# Patient Record
Sex: FEMALE | Race: WHITE | NOT HISPANIC OR LATINO | Employment: STUDENT | URBAN - METROPOLITAN AREA
[De-identification: names, ages, dates, MRNs, and addresses within clinical notes are randomized per-mention and may not be internally consistent; named-entity substitution may affect disease eponyms.]

---
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
## 2023-05-22 DIAGNOSIS — I35.1 NONRHEUMATIC AORTIC VALVE INSUFFICIENCY: ICD-10-CM

## 2023-05-22 DIAGNOSIS — I47.10 SVT (SUPRAVENTRICULAR TACHYCARDIA): Primary | ICD-10-CM

## 2023-06-12 ENCOUNTER — OFFICE VISIT (OUTPATIENT)
Dept: PEDIATRIC CARDIOLOGY | Facility: CLINIC | Age: 18
End: 2023-06-12
Payer: COMMERCIAL

## 2023-06-12 VITALS
RESPIRATION RATE: 18 BRPM | DIASTOLIC BLOOD PRESSURE: 62 MMHG | HEART RATE: 68 BPM | OXYGEN SATURATION: 99 % | WEIGHT: 126.31 LBS | SYSTOLIC BLOOD PRESSURE: 118 MMHG | BODY MASS INDEX: 20.3 KG/M2 | HEIGHT: 66 IN

## 2023-06-12 DIAGNOSIS — I47.10 SVT (SUPRAVENTRICULAR TACHYCARDIA): ICD-10-CM

## 2023-06-12 DIAGNOSIS — I35.1 NONRHEUMATIC AORTIC VALVE INSUFFICIENCY: ICD-10-CM

## 2023-06-12 PROCEDURE — 93000 ELECTROCARDIOGRAM COMPLETE: CPT | Mod: S$GLB,,, | Performed by: PEDIATRICS

## 2023-06-12 PROCEDURE — 3008F PR BODY MASS INDEX (BMI) DOCUMENTED: ICD-10-PCS | Mod: CPTII,S$GLB,, | Performed by: NURSE PRACTITIONER

## 2023-06-12 PROCEDURE — 3074F PR MOST RECENT SYSTOLIC BLOOD PRESSURE < 130 MM HG: ICD-10-PCS | Mod: CPTII,S$GLB,, | Performed by: NURSE PRACTITIONER

## 2023-06-12 PROCEDURE — 1160F PR REVIEW ALL MEDS BY PRESCRIBER/CLIN PHARMACIST DOCUMENTED: ICD-10-PCS | Mod: CPTII,S$GLB,, | Performed by: NURSE PRACTITIONER

## 2023-06-12 PROCEDURE — 99214 PR OFFICE/OUTPT VISIT, EST, LEVL IV, 30-39 MIN: ICD-10-PCS | Mod: 25,S$GLB,, | Performed by: NURSE PRACTITIONER

## 2023-06-12 PROCEDURE — 1159F PR MEDICATION LIST DOCUMENTED IN MEDICAL RECORD: ICD-10-PCS | Mod: CPTII,S$GLB,, | Performed by: NURSE PRACTITIONER

## 2023-06-12 PROCEDURE — 3078F PR MOST RECENT DIASTOLIC BLOOD PRESSURE < 80 MM HG: ICD-10-PCS | Mod: CPTII,S$GLB,, | Performed by: NURSE PRACTITIONER

## 2023-06-12 PROCEDURE — 3074F SYST BP LT 130 MM HG: CPT | Mod: CPTII,S$GLB,, | Performed by: NURSE PRACTITIONER

## 2023-06-12 PROCEDURE — 3078F DIAST BP <80 MM HG: CPT | Mod: CPTII,S$GLB,, | Performed by: NURSE PRACTITIONER

## 2023-06-12 PROCEDURE — 1160F RVW MEDS BY RX/DR IN RCRD: CPT | Mod: CPTII,S$GLB,, | Performed by: NURSE PRACTITIONER

## 2023-06-12 PROCEDURE — 99214 OFFICE O/P EST MOD 30 MIN: CPT | Mod: 25,S$GLB,, | Performed by: NURSE PRACTITIONER

## 2023-06-12 PROCEDURE — 1159F MED LIST DOCD IN RCRD: CPT | Mod: CPTII,S$GLB,, | Performed by: NURSE PRACTITIONER

## 2023-06-12 PROCEDURE — 3008F BODY MASS INDEX DOCD: CPT | Mod: CPTII,S$GLB,, | Performed by: NURSE PRACTITIONER

## 2023-06-12 PROCEDURE — 93000 EKG 12-LEAD: ICD-10-PCS | Mod: S$GLB,,, | Performed by: PEDIATRICS

## 2023-06-12 NOTE — PROGRESS NOTES
[facility]  Lesia Monreal  2005    Lesia Monreal is a 18 y.o. female presenting for follow-up of SVT, AI, and dysautonomia.  Lesia is here today with her significant other.    [facility]  Lesia Monreal was initially sent for cardiac evaluation in Feb of 2021 for chest pressure, dyspnea, and palpitations following COVID illness (dx 11/30/20). Work-up by PCP that day included CBC (WNL), D-dimer (<0.19, L), CMP (eGFR 128H, otherwise normal). She was referred due to chest pain, fatigue, dyspnea, and palpations and seen on 2/3/21.  At her visit here she reported fatigue, SOB, chest pain, palpations, dizziness, and anxiety. No murmur noted on exam. Work up was ordered including CRP, cardiac enzymes, holter and echo. Labs were normal. Echo showed trace AI.  Holter 2/3/21 showed sinus rhythm, rare PAC/PVC, and sinus rhythm with rare PVCs during diary symptoms. During awake hours, her HR was increased which was felt to be due to dysautonomia and protocol was recommended. Repeat holter was ordered 2-3 weeks after increasing fluid and sodium intake to see if helped with the average HR.  Repeat holter 4/12/21 showed one slow 13 beat run of SVT (likely atrial tachycardia) with max rate of 162 bpm. She was sitting in class during that time.   She reports she did not always press the button with symptoms if she was busy in class.  She was started on 12.5 mg of Tenormin BID due to her history of  one slow 13 beat run of SVT (likely atrial tachycardia) with max rate of 162 bpm and since she was symptomatic with heart racing several times a week.  Repeat holter unremarkable.       She was last seen in Feb of 2022 and at that time was doing well.  She had history of recent COVID and felt sluggish afterward.  Her atenolol was decreased to once a day with improvement.  Her exam that day revealed normal heart sounds and no murmur.  Her EKG was normal.  Holter was repeated on the lower dose of atenolol.  Echo was ordered and  she was asked to follow up in 1 year.    Lesia has been doing well since last visit. Lesia has a lot of energy and does not get short of breath with activity. Minimal positional dizziness. Denies any recent illness, surgeries, or hospitalizations.    There are no reports of chest pain, chest pain with exertion, cyanosis, exercise intolerance, dyspnea, fatigue, palpitations, syncope, and tachypnea. No other cardiovascular or medical concerns are reported.     Current Medications:   Current Outpatient Medications on File Prior to Visit   Medication Sig Dispense Refill    atenoloL (TENORMIN) 25 MG tablet Take 0.5 tablets (12.5 mg total) by mouth once daily. 45 tablet 2     No current facility-administered medications on file prior to visit.     Allergies: Review of patient's allergies indicates:  No Known Allergies      Family History   Problem Relation Age of Onset    Hypertension Mother     Hypertension Father     No Known Problems Sister     Colon cancer Maternal Grandmother     Dementia Maternal Grandmother     Hypertension Maternal Grandmother     Prostate cancer Maternal Grandfather     Hypertension Maternal Grandfather     Diabetes Maternal Grandfather     Coronary artery disease Paternal Grandfather         stents and bypass    Heart attacks under age 50 Paternal Grandfather     No Known Problems Sister     Arrhythmia Neg Hx     Cardiomyopathy Neg Hx     Congenital heart disease Neg Hx     Early death Neg Hx     Long QT syndrome Neg Hx      Past Medical History:   Diagnosis Date    Aortic insufficiency     COVID-19 11/2020    Dizziness     Dysautonomia     SVT (supraventricular tachycardia)      Social History     Socioeconomic History    Marital status: Single   Social History Narrative    Exercise:light workouts at home.     Fluid intake: tap and bottled water all day (6+ bottles), occasional sprite    Sleep: 6 hours each night     Past Surgical History:   Procedure Laterality Date    ADENOIDECTOMY       "MYRINGOTOMY W/ TUBES      x 5 sets    NASAL SINUS SURGERY      Sinus Irrigation        Review of Systems    GENERAL: No fever, chills, fatigability, malaise  or weight loss.  CHEST: Denies dyspnea on exertion, cyanosis, wheezing, cough, sputum production   CARDIOVASCULAR: Denies chest pain, palpitations, diaphoresis,  or reduced exercise tolerance.  ABDOMEN: Appetite normal. Denies diarrhea, abdominal pain, nausea or vomiting.  PERIPHERAL VASCULAR: No edema or cyanosis.  NEUROLOGIC: no dizziness, no syncope , no headache   MUSCULOSKELETAL: Denies muscle weakness, joint pain  PSYCHOLOGICAL/BEHAVIORAL: Denies anxiety, severe stress, confusion  SKIN: no rashes, lesions  HEMATOLOGIC: Denies any abnormal bruising or bleeding  ALLERGY/IMMUNOLOGIC: Denies any environmental allergies.     Objective:   /62 (BP Location: Right arm, Patient Position: Sitting, BP Method: Medium (Manual))   Pulse 68   Resp 18   Ht 5' 5.75" (1.67 m)   Wt 57.3 kg (126 lb 5.2 oz)   SpO2 99%   BMI 20.55 kg/m²     Physical Exam  GENERAL: Awake, well-developed well-nourished, no apparent distress  HEENT: mucous membranes moist and pink, normocephalic, no cranial or carotid bruits, sclera anicteric  CHEST: Good air movement, clear to auscultation bilaterally  CARDIOVASCULAR: Quiet precordium, regular rhythm, single S1, split S2, muffled P2, No S3 or S4, no rub. No clicks or rumbles. No cardiomegaly by palpation. No murmur  ABDOMEN: Soft, nontender nondistended, no hepatosplenomegaly, no aortic bruits  EXTREMITIES: Warm well perfused, 2+ brachial/femoral pulses, capillary refill <3 seconds, no clubbing, cyanosis, or edema  NEURO: Alert and oriented, cooperative with exam, face symmetric, moves all extremities well.    Tests:   Today's EKG interpretation by Dr. Haley reveals:   Sinus Rhythm  PAC  Otherwise normal  (Final report in electronic medical record)    Echocardiogram:   Pertinent findings from the Echo dated 6/17/22 are:   There are 4 " chambers with normally aligned great vessels.  Chamber sizes are qualitatively normal.  There is good LV function.  There are no shunts noted.  Physiological TR, PI.  The right coronary artery and left coronary are patent by 2D.  Trace AI  No AS, nor coarctation of Ao  LA Volume 19 ml/m2   LV lateral tissue doppler data WNL   TAPSE 2.3 cm   Clinical Correlation Suggested  Selective IE  Followup suggested  (Full report in electronic medical record)    Holter/Event results from 2/7/22 are:  Sinus rhythm  Rare atrial ectopy including one slow triplet  Rare PVCs  No diary symptoms      Assessment:  1. SVT (supraventricular tachycardia)    2. Nonrheumatic aortic valve insufficiency        Discussion/Plan:   Lesai Monreal is a 18 y.o. female with a history of slow SVT on atenolol 25 mg daily and trace aortic insufficiency.  She is doing well with minimal complaints of dizziness.  She has improved her intake.  No complaints of palpitations.  One PAC noted on an otherwise normal EKG.  We will plan for echo in the near future to reassess the valve and follow-up in 1 year.     We discussed possible symptoms of dysrhythmias including fluttering feeling in the chest, shortness of breath, chest pain or pressure, neck fullness, lightheadedness or dizziness, fainting or almost fainting, palpitations (the sense that your heart is fluttering or beating fast or hard or irregularly), tiredness, fatigue, or weakness. The family should contact the primary provider if these symptoms occur and if needed, we will see the patient.    I have reviewed our general guidelines related to cardiac issues with the family.  I instructed them in the event of an emergency to call 911 or go to the nearest emergency room.  They know to contact the PCP if problems arise or if they are in doubt. The patient should see a dentist every 6 months for routine dental care.    Follow up with the primary care provider for the following issues: Nothing  identified.    Activity:No activity restrictions are indicated at this time. Activities may include endurance training, interscholastic athletic, competition and contact sports.    Selective endocarditis prophylaxis is recommended in this circumstance.     I spent over 30 minutes with the patient. Over 50% of the time was spent counseling the patient and family member.    Patient or family member was asked to call the office within 3 days of any testing for results.     Dr. Haley reviewed history and physical exam. He then performed the physical exam. He discussed the findings with the patient's caregiver(s), and answered all questions. I have reviewed our general guidelines related to cardiac issues with the family. I instructed them in the event of an emergency to call 911 or go to the nearest emergency room. They know to contact the PCP if problems arise or if they are in doubt.    Medications:   Current Outpatient Medications   Medication Sig    atenoloL (TENORMIN) 25 MG tablet Take 0.5 tablets (12.5 mg total) by mouth once daily.     No current facility-administered medications for this visit.      Orders:   Orders Placed This Encounter   Procedures    Pediatric Echo     Follow-Up:     Return to clinic in one year with EKG or sooner if there are any concerns. Echo.      Sincerely,  Tony Haley MD    Note Contributing Authors:  MD Lc Skinner, FRANKP-C  This documentation was created using [employer] voice recognition software. Content is subject to voice recognition errors.    06/12/2023    Attestation: Tony Haley MD    I have reviewed the records and agree with the above.

## 2023-06-12 NOTE — PATIENT INSTRUCTIONS
Tony Haley MD  Pediatric Cardiology  [address] [place] [id number]  [phone]    General Guidelines    Name: Lesia Monreal                   : 2005    Diagnosis:   1. SVT (supraventricular tachycardia)    2. Nonrheumatic aortic valve insufficiency        PCP: MARY Navarrete  PCP Phone Number: [phone]    If you have an emergency or you think you have an emergency, go to the nearest emergency room!     Breathing too fast, doesnt look right, consistently not eating well, your child needs to be checked. These are general indications that your child is not feeling well. This may be caused by anything, a stomach virus, an ear ache or heart disease, so please call MARY Navarrete. If MARY Navarrete thinks you need to be checked for your heart, they will let us know.     If your child experiences a rapid or very slow heart rate and has the following symptoms, call MARY Navarrete or go to the nearest emergency room.   unexplained chest pain   does not look right   feels like they are going to pass out   actually passes out for unexplained reasons   weakness or fatigue   shortness of breath  or breathing fast   consistent poor feeding     If your child experiences a rapid or very slow heart rate that lasts longer than 30 minutes call MARY Navarrete or go to the nearest emergency room.     If your child feels like they are going to pass out - have them sit down or lay down immediately. Raise the feet above the head (prop the feet on a chair or the wall) until the feeling passes. Slowly allow the child to sit, then stand. If the feeling returns, lay back down and start over.     It is very important that you notify MARY Navarrete first. MARY Navarrete or the ER Physician can reach Dr. Tony Haley at the office or through [facility] PICU at [phone] as needed.    Call our office ([phone]) one week after ALL tests for results.      PREVENTION OF BACTERIAL ENDOCARDITIS (selective IE)    A COPY OF THIS SHEET MUST BE GIVEN TO ALL OF YOUR DOCTORS OR HEALTH CARE PROVIDERS    You have received this information because you are at an increased risk for developing adverse outcomes from infective endocarditis (IE), also known as subacute bacterial endocarditis (SBE).    Patient Name:  Lesia Monreal    : 2005   Diagnosis:   1. SVT (supraventricular tachycardia)    2. Nonrheumatic aortic valve insufficiency        As of 2023, Tony Haley MD, Pediatric Cardiologist recommends that Lesia receive SELECTIVE USE of antibiotic prophylaxis from bacterial endocarditis.    Antibiotic prophylaxis with dental or surgical procedures is recommended in selected instances if your dentist, surgeon or physician believes there is a greater risk of infection.  For example:  1) Any significantly infected operative field (Example: dental abscess or ruptured appendix) which may increase the bacterial load to the blood stream during the procedure; 2) Benefits of antibiotic coverage should be weighed against risk of allergic reactions and anaphylaxis; therefore, their use should be carefully selected based on individual cases.     Antibiotic prophylaxis is NOT recommended for the following dental procedures or events: routine anesthetic injections through non-infected tissue; taking dental radiographs; placement of removable prosthodontic or orthodontic appliances; adjustment of orthodontic appliances; placement of orthodontic brackets; and shedding of deciduous teeth or bleeding from trauma to the lips or oral mucosa.   If recommended by the Health Care Provider - Antibiotic Prophylactic Regimens   Regimen - Single Dose 30-60 minutes before Procedure  Situation Agent Adults Children   Oral Amoxicillin 2g 50/mg/kg   Unable to take oral meds Ampicillin   OR  Cefazolin or ceftriaxone 2 g IM or IV1    1 g IM or IV 50 mg/kg IM or IV    50 mg/kg IM or IV   Allergic  to Penicillins or ampicillin-Oral regimen Cephalexin 2  OR  Clindamycin  OR  Azithromycin or clarithromycin 2 g    600 mg    500 mg 50 mg/kg    20 mg/kg    15 mg/kg   Allergic to penicillin or ampicillin and unable to take oral medications Cefazolin or ceftriaxone 3  OR  Clindamycin 1 g IM or IV    600 mg IM or IV 50 mg/kg IM or IV    20 mg/kg IM or IV   1IM - intramuscular; IV - intravenous  2Or other first or second generation oral cephalosporin in equivalent adult or pediatric dosage.  3Cephalosporins should not be used in an individual with a history of anaphylaxis, angioedema or urticaria with penicillin or ampicillin.   Adapted from Prevention of Infective Endocarditis: Guidelines From the American Heart Association, by the Committee on Rheumatic Fever, Endocarditis, and Kawasaki Disease. Circulation, e-published April 19, 2007. Go to www.americanheart.org/presenter for more information.    The practice of giving patients antibiotics prior to a dental procedure is no longer recommended EXCEPT for patients with the highest risk of adverse outcomes resulting from bacterial endocarditis. We cannot exclude the possibility that an exceedingly small number of cases, if any, of bacterial endocarditis may be prevented by antibiotic prophylaxis prior to a dental procedure. The importance of good oral and dental health and regular visits to the dentist is important for patients at risk for bacterial endocarditis.  Gastrointestinal (GI)/Genitourinary () Procedures: Antibiotic prophylaxis solely to prevent bacterial endocarditis is no longer recommended for patients who undergo a GI or  tract procedures, including patients with the highest risk of adverse outcomes due to bacterial endocarditis.    Good dental health and hygiene is very effective in preventing bacterial endocarditis.   Always practice good dental health!

## 2023-12-20 DIAGNOSIS — I47.10 SVT (SUPRAVENTRICULAR TACHYCARDIA): Primary | ICD-10-CM

## 2023-12-20 DIAGNOSIS — I35.1 NONRHEUMATIC AORTIC VALVE INSUFFICIENCY: ICD-10-CM

## 2024-01-02 DIAGNOSIS — I35.1 NONRHEUMATIC AORTIC VALVE INSUFFICIENCY: ICD-10-CM

## 2024-01-02 DIAGNOSIS — G90.1 DYSAUTONOMIA: Primary | ICD-10-CM

## 2024-01-05 ENCOUNTER — OFFICE VISIT (OUTPATIENT)
Dept: PEDIATRIC CARDIOLOGY | Facility: CLINIC | Age: 19
End: 2024-01-05
Payer: COMMERCIAL

## 2024-01-05 ENCOUNTER — CLINICAL SUPPORT (OUTPATIENT)
Dept: PEDIATRIC CARDIOLOGY | Facility: CLINIC | Age: 19
End: 2024-01-05
Payer: COMMERCIAL

## 2024-01-05 VITALS
HEART RATE: 67 BPM | SYSTOLIC BLOOD PRESSURE: 116 MMHG | DIASTOLIC BLOOD PRESSURE: 64 MMHG | HEIGHT: 66 IN | WEIGHT: 128.06 LBS | OXYGEN SATURATION: 99 % | BODY MASS INDEX: 20.58 KG/M2 | RESPIRATION RATE: 18 BRPM

## 2024-01-05 DIAGNOSIS — I47.10 SVT (SUPRAVENTRICULAR TACHYCARDIA): ICD-10-CM

## 2024-01-05 DIAGNOSIS — I35.1 NONRHEUMATIC AORTIC VALVE INSUFFICIENCY: ICD-10-CM

## 2024-01-05 DIAGNOSIS — R00.2 PALPITATIONS: Primary | ICD-10-CM

## 2024-01-05 DIAGNOSIS — G90.1 DYSAUTONOMIA: ICD-10-CM

## 2024-01-05 PROBLEM — Z86.16 HISTORY OF 2019 NOVEL CORONAVIRUS DISEASE (COVID-19): Status: RESOLVED | Noted: 2021-02-03 | Resolved: 2024-01-05

## 2024-01-05 PROCEDURE — 3008F BODY MASS INDEX DOCD: CPT | Mod: CPTII,S$GLB,, | Performed by: PHYSICIAN ASSISTANT

## 2024-01-05 PROCEDURE — 3078F DIAST BP <80 MM HG: CPT | Mod: CPTII,S$GLB,, | Performed by: PHYSICIAN ASSISTANT

## 2024-01-05 PROCEDURE — 3074F SYST BP LT 130 MM HG: CPT | Mod: CPTII,S$GLB,, | Performed by: PHYSICIAN ASSISTANT

## 2024-01-05 PROCEDURE — 99214 OFFICE O/P EST MOD 30 MIN: CPT | Mod: S$GLB,,, | Performed by: PHYSICIAN ASSISTANT

## 2024-01-05 PROCEDURE — 93000 ELECTROCARDIOGRAM COMPLETE: CPT | Mod: S$GLB,,, | Performed by: PEDIATRICS

## 2024-01-05 PROCEDURE — 1159F MED LIST DOCD IN RCRD: CPT | Mod: CPTII,S$GLB,, | Performed by: PHYSICIAN ASSISTANT

## 2024-01-05 PROCEDURE — 1160F RVW MEDS BY RX/DR IN RCRD: CPT | Mod: CPTII,S$GLB,, | Performed by: PHYSICIAN ASSISTANT

## 2024-01-05 NOTE — PATIENT INSTRUCTIONS
Tony Haley MD  Pediatric Cardiology  [address] [place] [id number]  [phone]    General Guidelines    Name: Lesia Monreal                   : 2005    Diagnosis:   1. Palpitations    2. SVT (supraventricular tachycardia)    3. Nonrheumatic aortic valve insufficiency        PCP: Naomi Head FNP-C  PCP Phone Number: [phone]    If you have an emergency or you think you have an emergency, go to the nearest emergency room!     Breathing too fast, doesnt look right, consistently not eating well, your child needs to be checked. These are general indications that your child is not feeling well. This may be caused by anything, a stomach virus, an ear ache or heart disease, so please call Naomi Head FNP-C. If Naomi Head FNP-C thinks you need to be checked for your heart, they will let us know.     If your child experiences a rapid or very slow heart rate and has the following symptoms, call Naomi Head FNP-C or go to the nearest emergency room.   unexplained chest pain   does not look right   feels like they are going to pass out   actually passes out for unexplained reasons   weakness or fatigue   shortness of breath  or breathing fast   consistent poor feeding     If your child experiences a rapid or very slow heart rate that lasts longer than 30 minutes call Naomi Head FNP-C or go to the nearest emergency room.     If your child feels like they are going to pass out - have them sit down or lay down immediately. Raise the feet above the head (prop the feet on a chair or the wall) until the feeling passes. Slowly allow the child to sit, then stand. If the feeling returns, lay back down and start over.     It is very important that you notify Naomi Head FNP-C first. Naomi Head FNP-C or the ER Physician can reach Dr. Tony Haley at the office or through [facility] at [phone] as needed.    Call our office  ([phone]) one week after ALL tests for results.

## 2024-01-05 NOTE — PROGRESS NOTES
[facility]  Lesia Monreal  2005    Lesia Monreal is a 18 y.o. female presenting for follow-up of   1. SVT (supraventricular tachycardia)    2. Nonrheumatic aortic valve insufficiency     Lesia is here today with her boyfriend.    HPI  Lesia was seen by PCP on 1/20/21 for chest pressure, dyspnea, and palpitations following COVID illness (dx 11/30/20). Work-up by PCP that day included CBC (WNL), D-dimer (<0.19, L), CMP (eGFR 128H, otherwise normal). She was referred due to chest pain, fatigue, dyspnea, and palpations and seen on 2/3/21.  At her visit here she reported fatigue, SOB, chest pain, palpations, dizziness, and anxiety. No murmur noted on exam. Work up was ordered including CRP, cardiac enzymes, holter and echo. Labs were normal. Echo showed trace AI.  Holter 2/3/21 showed sinus rhythm, rare PAC/PVC, and sinus rhythm with rare PVCs during diary symptoms. During awake hours, her HR was increased which was felt to be due to dysautonomia and protocol was recommended. Repeat holter was ordered 2-3 weeks after increasing fluid and sodium intake to see if helped with the average HR.  Repeat holter 4/12/21 showed one slow 13 beat run of SVT (likely atrial tachycardia) with max rate of 162 bpm. She was sitting in class during that time.   She reports she did not always press the button with symptoms if she was busy in class.  She was started on 12.5 mg of Tenormin BID due to her history of  one slow 13 beat run of SVT (likely atrial tachycardia) with max rate of 162 bpm and since she was symptomatic with heart racing several times a week.  Repeat holter was done 5/24/21 that showed Sinus rhythm with heart rates varying between 51 and 140 bpm with an average of 80 bpm, rare PAC/PVC and Sinus rhythm during diary symptom of dizziness.  In December 2021, Her PCP told her to decrease her Tenormin dose to once a day.     She was last seen 6/12/23. No murmur noted. Atenolol 25 mg daily was continued.  Echo was ordered which will be done today. She was given a 1 year follow up She returns early today due to palpations.      Lesia has been doing well since last visit. She reports 1 month ago her HR was in the 90's all day. She got in the shower and her was 180 bpm and was dizzy. She laid down and her HR dropped to 110 bpm. She reports episodes of fluttering followed by racing. This occurs mainly while in taking long showers.This occurs 1-2 times a week. The episodes will last about 1 minute but resolve if she sits.      She is drinking 16-32 oz a day and 2-3 cokes a day. She does not breakfast. She does not eat lunch. 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  She is tolerating Tenormin 12.5 mg daily.     Lesia has a lot of energy and does not get short of breath with activity.  Denies any recent illness, surgeries, or hospitalizations.    There are no reports of chest pain, chest pain with exertion, cyanosis, exercise intolerance, dyspnea, fatigue, syncope, and tachypnea. No other cardiovascular or medical concerns are reported.      Medications:   Medication List with Changes/Refills   Current Medications    ATENOLOL (TENORMIN) 25 MG TABLET    Take 0.5 tablets (12.5 mg total) by mouth once daily.      Allergies: Review of patient's allergies indicates:  No Known Allergies  Family History   Problem Relation Age of Onset    Hypertension Mother     Hypertension Father     No Known Problems Sister     Colon cancer Maternal Grandmother     Dementia Maternal Grandmother     Hypertension Maternal Grandmother     Prostate cancer Maternal Grandfather     Hypertension Maternal Grandfather     Diabetes Maternal Grandfather     Coronary artery disease Paternal Grandfather         stents and bypass    Heart attacks under age 50 Paternal Grandfather     No Known Problems Sister     Arrhythmia Neg Hx     Cardiomyopathy Neg Hx     Congenital heart disease Neg Hx     Early death Neg Hx      Long QT syndrome Neg Hx      Past Medical History:   Diagnosis Date    Aortic insufficiency     COVID-19 11/2020    SVT (supraventricular tachycardia)      Social History     Social History Narrative    Exercise:light workouts at home.     Fluid intake: tap and bottled water all day (6+ bottles), occasional sprite    Sleep: 6 hours each night    Birth control: was taking it for a couple of months and started making her have SVT episodes so she stopped taking them, wants to talk more about that      Past Surgical History:   Procedure Laterality Date    ADENOIDECTOMY      MYRINGOTOMY W/ TUBES      x 5 sets    NASAL SINUS SURGERY      Sinus Irrigation      No birth history on file.  Immunization History   Administered Date(s) Administered    DTaP 2005, 2005, 2003, 08/28/2007    DTaP / HiB 08/03/2004    HIB 2003    HPV 9-Valent 07/24/2019    HPV Quadrivalent 12/09/2014    Hep B / HiB 2003, 2003    Hepatitis A, Pediatric/Adolescent, 2 Dose 12/09/2014, 07/24/2019    Hepatitis B, Pediatric/Adolescent 2003    IPV 2003, 2003, 2003, 08/28/2007    MMR 08/03/2004    MMRV 08/28/2007    Meningococcal B, OMV 07/24/2019, 09/04/2019    Meningococcal Conjugate (MCV4P) 12/09/2014, 07/24/2019    Pneumococcal Conjugate - 7 Valent 2003, 2003, 2003    Tdap 12/09/2014    Varicella 08/03/2004     Immunizations were reviewed today and if not current, recommend follow up with the PCP for further management.  Past medical history, family history, surgical history, social history updated and reviewed today.     Review of Systems  GENERAL: No fever, chills, fatigability, malaise, or weight loss.  CHEST: Denies RODRIGUEZ, cyanosis, wheezing, cough, sputum production, or SOB.  CARDIOVASCULAR: + palpations, Denies chest pain, diaphoresis, SOB, or reduced exercise tolerance.  Endocrine: Denies polyphagia, polydipsia, or polyuria  Skin: Denies rashes or color change  HENT:  "Negative for congestion, headaches and sore throat.   ABDOMEN: Appetite fine. No weight loss. Denies diarrhea, abdominal pain, nausea, or vomiting.  PERIPHERAL VASCULAR: No edema, varicosities, or cyanosis.  Musculoskeletal: Negative for muscle weakness and stiffness.  NEUROLOGIC:+ dizziness, no history of syncope by report, no headache   Psychiatric/Behavioral: Negative for altered mental status. The patient is not nervous/anxious.   Allergic/Immunologic: Negative for environmental allergies.   : dysuria, hematuria, polyuria    Objective:   /64 (BP Location: Right arm, Patient Position: Sitting, BP Method: Medium (Manual))   Pulse 67   Resp 18   Ht 5' 6" (1.676 m)   Wt 58.1 kg (128 lb 1.4 oz)   SpO2 99%   BMI 20.67 kg/m²   Body surface area is 1.64 meters squared.  Growth [employer] can only be used for patients less than 20 years old.    Physical Exam  GENERAL: Awake, well-developed well-nourished, no apparent distress  HEENT: mucous membranes moist and pink, normocephalic, no cranial or carotid bruits, sclera anicteric  NECK:  no lymphadenopathy  CHEST: Good air movement, clear to auscultation bilaterally  CARDIOVASCULAR: Quiet precordium, regular rate and rhythm, single S1, split S2, normal P2, No S3 or S4, no rubs or gallops. No clicks or rumbles. No cardiomegaly by palpation.No murmur not  ABDOMEN: Soft, nontender nondistended, no hepatosplenomegaly, no aortic bruits  EXTREMITIES: Warm well perfused, 2+ radial/pedal/femoral pulses, capillary refill 2 seconds, no clubbing, cyanosis, or edema  NEURO: Alert and oriented, cooperative with exam, face symmetric, moves all extremities well.  Skin: pink, turgor WNL  Vitals reviewed     Tests:   Today's EKG interpretation by Dr. Haley reveals:   NSR  WNL  (Final report in electronic medical record)    Echocardiogram:   Pertinent findings from the Echo dated 6/17/22 are:   There are 4 chambers with normally aligned great vessels.  Chamber sizes are " qualitatively normal.  There is good LV function.  There are no shunts noted.  Physiological TR, PI.  The right coronary artery and left coronary are patent by 2D.  Trace AI  No AS, nor coarctation of Ao  LA Volume 19 ml/m2   LV lateral tissue doppler data WNL   TAPSE 2.3 cm   Clinical Correlation Suggested  Selective IE  Followup suggested  (Full report in electronic medical record)     Holter/Event results from 2/7/22 are:  Sinus rhythm  Rare atrial ectopy including one slow triplet  Rare PVCs  No diary symptoms    Assessment:  Patient Active Problem List   Diagnosis    Palpitations    Nonrheumatic aortic valve insufficiency    Dizziness    SVT (supraventricular tachycardia)    Dysautonomia     Discussion/ Plan:   I have reviewed our general guidelines related to cardiac issues with the family.  I instructed them in the event of an emergency to call 911 or go to the nearest emergency room.  They know to contact the PCP if problems arise or if they are in doubt.    She had trivial AI noted on her echo. NO AI noted on exam. Repeat echo done today is pending Caregiver instructed to call one week after testing for results. Caregiver expressed understanding.     She was started on 12.5 mg of Tenormin daily due to her history of one slow 13 beat run of SVT (likely atrial tachycardia) with max rate of 162 bpm and since she was symptomatic with heart racing several times a week. She reports palpation and dizziness mainly while taking long hot showers. She is not following dysautonomia protocol and drinking excessive caffeine. She will cut out caffeine and follow dysautonomia protocol. If her palpations continue, she will call and will repeat her holter. Dysrhythmia precautions should be followed. Discussed signs and symptoms to alert us about. If Lesia appears in distress, they are go to the closest ER and alert us as well. Lesia  appears very stable from a cardiac standpoint today. Will repeat EKG at next visit.     We  have discussed dysautonomia at length today. Dysautonomia is a term used to describe a multitude of symptoms that can occur with dysfunction of the autonomic nervous system. The autonomic nervous system serves as the main communication link between the brain and the organs without conscious effort. There are different types of dysautonomia including postural orthostatic tachycardia syndrome (POTS), orthostatic hypotension (OH), and myalgic encephalomyelitis (ME) which is also known as chronic fatigue syndrome (CFS). Dysautonomia causes many symptoms that vary from person to person and can range in severity.  Common symptoms include: severe dizziness and fainting, headaches, severe fatigue, difficulty with concentration, heat or cold intolerance, palpitations, chest pain, weakness, venous pooling, nausea, vomiting, abdominal discomfort, and joint/muscle pain.  In part, these symptoms can be managed with a combination of non-pharmacologic interventions, including ensuring adequate fluid and salt intake, not skipping meals, limiting caffeine, self-limiting activities, and medications. There is nothing magic that we can do to make all of the symptoms go away.  The hope is to reduce symptoms so that important things such as the activities of daily living and education may be easier. It is important to know that symptoms may vary from hour to hour, day to day, throughout the month (especially for females), and throughout the year. Symptoms may abruptly start and are sometimes triggered by illnesses such as mono or flu.  Different people can have different combinations of symptoms. It is important to keep a daily log including fluid intake and symptoms. Protocol and guidelines were reviewed and include no dark water swimming without a life vest, no clear water swimming without a life vest and/or strict  and/or adult supervision.  If syncope or presyncope is experienced, they are to resume a position of comfort,  either sitting or laying down.  I also suggested they elevate their feet 6 inches above their head.     Dysautonomia symptoms can be controlled by using a combination of medications and nonpharmacologic treatments which include:  Drink 80+ ounces of fluid (tap water, Propel, Gatorade, G2, Powerade, Powerade zero, Splash) each day, and have a salty snack (pretzels, saltines, pickles).    Dont skip meals. Recommend eating 5-6 small meals a day.  Avoid large meals that contain a lot of carbohydrates which may exacerbate your symptoms.   No caffeine (its a diuretic, so it makes you urinate and empty your tank of fluid)  Raise the head of your bed 4-6 inches on something firm to help reduce dizziness in the morning when you get up  Insomnia can be treated with good sleep habits:  Lower the lights one hour before bedtime  Do a relaxing activity, such as reading under low light, massage, meditation, yoga, stretching, or a warm bath.    Turn off the television, computer and video games, and stop cell phone use.  When it is time for bed, the room should be dark (no night lights) and cool, but not cold.  Avoid triggers that worsen symptoms:  Have a consistent bedtime and amount of sleep (10-14 hours for adolescents).  Avoid extreme heat or cold.  Avoid stressful situations if possible  Wear compression stockings (30-40 mmHg) which should extend from waist to toe. They should be worn during awake hours.  A cooling vest is a vest with gel inserts that can be cooled in the freezer, then inserted into the vest and worn when it is hot outside.  There are also evaporative cooling vests, as well.  Patients who cannot tolerate the heat often appreciate these.     Coping with a chronic disease is stressful.  Many families find it helpful to see a mental health provider.    Participating in exercise is critical to the successful management of dysautonomia, and to the long-term improvement and resolution of symptoms.  Start with a  small amount of leg and core strengthening exercise, such as 5 minutes/day, and increasing by 5 minutes/day every week up to 30 to 60 minutes/day. Despite possible initial worsening of symptoms and decreased overall energy, we recommend to try to push through as best as possible.  If needed, you may take a two-day break, and then resume exercise at a lower duration and/or intensity, and work back up to following the protocol. Again, this is a vital part of the program and is important for you to follow.  Failure to exercise regularly makes it difficult for us to help you manage your  symptoms and may contribute to ongoing problems and quality of life.     I spent a total of 30 minutes on the day of the visit.  This includes face to face time and non-face to face time preparing to see the patient (eg, review of tests), obtaining and/or reviewing separately obtained history, documenting clinical information in the electronic or other health record, independently interpreting results and communicating results to the patient/family/caregiver, or care coordinator.     Activity:She can participate in normal age-appropriate activities. She should be allowed to set .his own pace and rest if fatigued. If Lesia becomes lightheaded or feels as if she may pass out, patient should assume a position of comfort immediately (sit down or lie down) until the feeling passes. Do not make them walk somewhere to sit down. Please allow the patient to drink 60-100oz of fluids (Gatorade/Powerade/tap water/Splash/Propel) and eat salty snacks throughout the day (both at home and at school) to minimize the likeliness of dizziness. Please allow frequent bathroom breaks due to increased fluid intake. There should be no dark water swimming without a life vest and there should be no clear water swimming without adult or  supervision.     No endocarditis prophylaxis is recommended in this circumstance.      Medications:   Medication List  with Changes/Refills   Current Medications    ATENOLOL (TENORMIN) 25 MG TABLET    Take 0.5 tablets (12.5 mg total) by mouth once daily.         Orders placed this encounter  No orders of the defined types were placed in this encounter.      Follow-Up:   Return to dysautonomia clinic in 1-2 months pending echo or sooner if there are any concerns    Sincerely,  Tony Haley MD    Note Contributing Authors:  MD Karen Skinner PA-C  01/05/2024    Attestation: Tony Haley MD  I have reviewed the records and agree with the above.  I agree with the plan and the follow up instructions.

## 2024-04-09 NOTE — PROGRESS NOTES
[facility]  Lesia Monreal  2005    Lesia Monreal is a 19 y.o. female presenting for follow-up of    Palpitations    Nonrheumatic aortic valve insufficiency    Dizziness    SVT (supraventricular tachycardia)    Dysautonomia   Lesia is here today with her boyfriend    ANNA Graf was seen by PCP on 1/20/21 for chest pressure, dyspnea, and palpitations following COVID illness (dx 11/30/20). Work-up by PCP that day included CBC (WNL), D-dimer (<0.19, L), CMP (eGFR 128H, otherwise normal). She was referred due to chest pain, fatigue, dyspnea, and palpations and seen on 2/3/21.  At her visit here she reported fatigue, SOB, chest pain, palpations, dizziness, and anxiety. No murmur noted on exam. Work up was ordered including CRP, cardiac enzymes, holter and echo. Labs were normal. Echo showed trace AI.  Holter 2/3/21 showed sinus rhythm, rare PAC/PVC, and sinus rhythm with rare PVCs during diary symptoms. During awake hours, her HR was increased which was felt to be due to dysautonomia and protocol was recommended. Repeat holter was ordered 2-3 weeks after increasing fluid and sodium intake to see if helped with the average HR.  Repeat holter 4/12/21 showed one slow 13 beat run of SVT (likely atrial tachycardia) with max rate of 162 bpm. She was sitting in class during that time.   She reports she did not always press the button with symptoms if she was busy in class.  She was started on 12.5 mg of Tenormin BID due to her history of  one slow 13 beat run of SVT (likely atrial tachycardia) with max rate of 162 bpm and since she was symptomatic with heart racing several times a week.  Repeat holter was done 5/24/21 that showed Sinus rhythm with heart rates varying between 51 and 140 bpm with an average of 80 bpm, rare PAC/PVC and Sinus rhythm during diary symptom of dizziness.  In December 2021, Her PCP told her to decrease her Tenormin dose to once a day.      She was last seen 1/4/24.  She reported  palpations and dizziness. She was drinking 16-32 oz a day and 2-3 cokes a day.  No murmur noted. EKG WNL. She was to cut out caffeine and stay well hydrated. If her palpations continued, she was to call and would do a holter. Dysautonomia protocol was reviewed. She was given a 3 month follow up.   Echo same day showed  stable trivial AI.        Lesia has been doing well since last visit.  She is staying well hydrated.  She is nursing school.  She states about 3 months ago she felt like her Tenormin  dose wasn't lasting all day (12.5 mg daily) and her PCP told her to increase it to 25 mg daily and since then she has felt great. Mom states Lesia has a lot of energy and does not get short of breath with activity.  Denies any recent illness, surgeries, or hospitalizations.    There are no reports of chest pain, chest pain with exertion, cyanosis, exercise intolerance, dyspnea, fatigue, palpitations, syncope, and tachypnea. No other cardiovascular or medical concerns are reported.      Medications:   Medication List with Changes/Refills   Changed and/or Refilled Medications    Modified Medication Previous Medication    ATENOLOL (TENORMIN) 25 MG TABLET atenoloL (TENORMIN) 25 MG tablet       Take 1 tablet (25 mg total) by mouth once daily.    Take 0.5 tablets (12.5 mg total) by mouth once daily.      Allergies: Review of patient's allergies indicates:  No Known Allergies  Family History   Problem Relation Name Age of Onset    Hypertension Mother      Hypertension Father      No Known Problems Sister      Colon cancer Maternal Grandmother      Dementia Maternal Grandmother      Hypertension Maternal Grandmother      Prostate cancer Maternal Grandfather      Hypertension Maternal Grandfather      Diabetes Maternal Grandfather      Coronary artery disease Paternal Grandfather          stents and bypass    Heart attacks under age 50 Paternal Grandfather      No Known Problems Sister      Arrhythmia Neg Hx      Cardiomyopathy  Neg Hx      Congenital heart disease Neg Hx      Early death Neg Hx      Long QT syndrome Neg Hx       Past Medical History:   Diagnosis Date    Aortic insufficiency     COVID-19 11/2020    Dizziness     Dysautonomia     Palpitations     SVT (supraventricular tachycardia)      Social History     Social History Narrative    Exercise:light workouts at home.     Fluid intake: tap and bottled water all day (6+ bottles), occasional sprite    Sleep: 6 hours each night    Birth control: was taking it for a couple of months and started making her have SVT episodes so she stopped taking them, wants to talk more about that      Past Surgical History:   Procedure Laterality Date    ADENOIDECTOMY      MYRINGOTOMY W/ TUBES      x 5 sets    NASAL SINUS SURGERY      Sinus Irrigation      No birth history on file.  Immunization History   Administered Date(s) Administered    DTaP 2005, 2005, 2005, 08/28/2007    DTaP / HiB 08/03/2004    HIB 2003    HPV 9-Valent 07/24/2019    HPV Quadrivalent 12/09/2014    Hep B / HiB 2003, 2003    Hepatitis A, Pediatric/Adolescent, 2 Dose 12/09/2014, 07/24/2019    Hepatitis B, Pediatric/Adolescent 2003    IPV 2003, 2003, 2003, 08/28/2007    MMR 08/03/2004    MMRV 08/28/2007    Meningococcal B, OMV 07/24/2019, 09/04/2019    Meningococcal Conjugate (MCV4P) 12/09/2014, 07/24/2019    Pneumococcal Conjugate - 7 Valent 2003, 2003, 2003    Tdap 12/09/2014    Varicella 08/03/2004     Immunizations were reviewed today and if not current, recommend follow up with the PCP for further management.  Past medical history, family history, surgical history, social history updated and reviewed today.     Review of Systems  GENERAL: No fever, chills, fatigability, malaise, or weight loss.  CHEST: Denies RODRIGUEZ, cyanosis, wheezing, cough, sputum production, or SOB.  CARDIOVASCULAR: Denies chest pain, palpitations, diaphoresis, SOB, or reduced  "exercise tolerance.  Endocrine: Denies polyphagia, polydipsia, or polyuria  Skin: Denies rashes or color change  HENT: Negative for congestion, headaches and sore throat.   ABDOMEN: Appetite fine. No weight loss. Denies diarrhea, abdominal pain, nausea, or vomiting.  PERIPHERAL VASCULAR: No edema, varicosities, or cyanosis.  Musculoskeletal: Negative for muscle weakness and stiffness.  NEUROLOGIC: no dizziness, no history of syncope by report, no headache   Psychiatric/Behavioral: Negative for altered mental status. The patient is not nervous/anxious.   Allergic/Immunologic: Negative for environmental allergies.   : dysuria, hematuria, polyuria    Objective:   /72 (BP Location: Right arm, Patient Position: Sitting, BP Method: Medium (Manual))   Pulse 74   Resp 18   Ht 5' 4.96" (1.65 m)   Wt 58.4 kg (128 lb 12 oz)   SpO2 99%   BMI 21.45 kg/m²   Body surface area is 1.64 meters squared.  Growth %ile SmartLinks can only be used for patients less than 20 years old.    Physical Exam  GENERAL: Awake, well-developed well-nourished, no apparent distress  HEENT: mucous membranes moist and pink, normocephalic, no cranial or carotid bruits, sclera anicteric  NECK:  no lymphadenopathy  CHEST: Good air movement, clear to auscultation bilaterally  CARDIOVASCULAR: Quiet precordium, regular rate and rhythm, single S1, split S2, normal P2, No S3 or S4, no rubs or gallops. No clicks or rumbles. No cardiomegaly by palpation. No murmur noted. No significant POTS.   ABDOMEN: Soft, nontender nondistended, no hepatosplenomegaly, no aortic bruits  EXTREMITIES: Warm well perfused, 2+ radial/pedal/femoral pulses, capillary refill 2 seconds, no clubbing, cyanosis, or edema  NEURO: Alert and oriented, cooperative with exam, face symmetric, moves all extremities well.  Skin: pink, turgor WNL  Vitals reviewed     Tests:   1/5/24 EKG interpretation by Dr. Haley reveals:   Normal sinus rhythm   Normal ECG   (Final report in electronic " medical record)    Echocardiogram:   Pertinent findings from the Echo dated 1/5/24 are:   There are 4 chambers with normally aligned great vessels. Chamber sizes are qualitatively normal. There is good LV function. There are no shunts noted. Physiological TR, PI. The right coronary artery and left coronary are patent by 2D. Trivial AI, MR LA Volume 16 ml/m2 RVSP 15 mmHg LV lateral tissue doppler data WNL TAPSE 2.4 cm Desc Ao PG 6 mmHg Selective IE Clinical Correlation Suggested   (Full report in electronic medical record)     Holter/Event results from 2/7/22 are:  Sinus rhythm  Rare atrial ectopy including one slow triplet  Rare PVCs  No diary symptoms    Assessment:  Patient Active Problem List   Diagnosis    Nonrheumatic aortic valve insufficiency    SVT (supraventricular tachycardia)    Dysautonomia       Discussion/ Plan:    I have reviewed our general guidelines related to cardiac issues with the family.  I instructed them in the event of an emergency to call 911 or go to the nearest emergency room.  They know to contact the PCP if problems arise or if they are in doubt.    Lesia has trivial AI.  Selective endocarditis prophylaxis is recommended per Dr. Haley. Thus far, the AI is not impacting the heart size or function. We usually monitor this with yearly visit and periodic echo pending clinical findings.    She was started on 12.5 mg of Tenormin daily due to her history of one slow 13 beat run of SVT (likely atrial tachycardia) with max rate of 162 bpm and since she was symptomatic with heart racing several times a week.  She states about 3 months ago she felt like her Tenormin  dose wasn't lasting all day (12.5 mg daily) and her PCP told her to increase it to 25 mg daily and since then she has felt great.  Will continue Tenormin at her 25 mg daily. Dysrhythmia precautions should be followed. Discussed signs and symptoms to alert us about. If Lesia appears in distress, they are go to the closest ER and alert  us as well. Lesia  appears very stable from a cardiac standpoint today. Will repeat EKG at next visit.     We have discussed dysautonomia at length today. Dysautonomia is a term used to describe a multitude of symptoms that can occur with dysfunction of the autonomic nervous system. The autonomic nervous system serves as the main communication link between the brain and the organs without conscious effort. There are different types of dysautonomia including postural orthostatic tachycardia syndrome (POTS), orthostatic hypotension (OH), and myalgic encephalomyelitis (ME) which is also known as chronic fatigue syndrome (CFS). Dysautonomia causes many symptoms that vary from person to person and can range in severity.  Common symptoms include: severe dizziness and fainting, headaches, severe fatigue, difficulty with concentration, heat or cold intolerance, palpitations, chest pain, weakness, venous pooling, nausea, vomiting, abdominal discomfort, and joint/muscle pain.  In part, these symptoms can be managed with a combination of non-pharmacologic interventions, including ensuring adequate fluid and salt intake, not skipping meals, limiting caffeine, self-limiting activities, and medications. There is nothing magic that we can do to make all of the symptoms go away.  The hope is to reduce symptoms so that important things such as the activities of daily living and education may be easier. It is important to know that symptoms may vary from hour to hour, day to day, throughout the month (especially for females), and throughout the year. Symptoms may abruptly start and are sometimes triggered by illnesses such as mono or flu.  Different people can have different combinations of symptoms. It is important to keep a daily log including fluid intake and symptoms. Protocol and guidelines were reviewed and include no dark water swimming without a life vest, no clear water swimming without a life vest and/or strict   and/or adult supervision.  If syncope or presyncope is experienced, they are to resume a position of comfort, either sitting or laying down.  I also suggested they elevate their feet 6 inches above their head.     Dysautonomia symptoms can be controlled by using a combination of medications and nonpharmacologic treatments which include:  Drink 80+ ounces of fluid (tap water, Propel, Gatorade, G2, Powerade, Powerade zero, Splash) each day, and have a salty snack (pretzels, saltines, pickles).    Dont skip meals. Recommend eating 5-6 small meals a day.  Avoid large meals that contain a lot of carbohydrates which may exacerbate your symptoms.   No caffeine (its a diuretic, so it makes you urinate and empty your tank of fluid)  Raise the head of your bed 4-6 inches on something firm to help reduce dizziness in the morning when you get up  Insomnia can be treated with good sleep habits:  Lower the lights one hour before bedtime  Do a relaxing activity, such as reading under low light, massage, meditation, yoga, stretching, or a warm bath.    Turn off the television, computer and video games, and stop cell phone use.  When it is time for bed, the room should be dark (no night lights) and cool, but not cold.  Avoid triggers that worsen symptoms:  Have a consistent bedtime and amount of sleep (10-14 hours for adolescents).  Avoid extreme heat or cold.  Avoid stressful situations if possible  Wear compression stockings (30-40 mmHg) which should extend from waist to toe. They should be worn during awake hours.  A cooling vest is a vest with gel inserts that can be cooled in the freezer, then inserted into the vest and worn when it is hot outside.  There are also evaporative cooling vests, as well.  Patients who cannot tolerate the heat often appreciate these.     Coping with a chronic disease is stressful.  Many families find it helpful to see a mental health provider.    Participating in exercise is critical to the  successful management of dysautonomia, and to the long-term improvement and resolution of symptoms.  Start with a small amount of leg and core strengthening exercise, such as 5 minutes/day, and increasing by 5 minutes/day every week up to 30 to 60 minutes/day. Despite possible initial worsening of symptoms and decreased overall energy, we recommend to try to push through as best as possible.  If needed, you may take a two-day break, and then resume exercise at a lower duration and/or intensity, and work back up to following the protocol. Again, this is a vital part of the program and is important for you to follow.  Failure to exercise regularly makes it difficult for us to help you manage your  symptoms and may contribute to ongoing problems and quality of life.      Activity:She can participate in normal age-appropriate activities. She should be allowed to set .his own pace and rest if fatigued. If Lesia becomes lightheaded or feels as if she may pass out, patient should assume a position of comfort immediately (sit down or lie down) until the feeling passes. Do not make them walk somewhere to sit down. Please allow the patient to drink 60-100oz of fluids (Gatorade/Powerade/tap water/Splash/Propel) and eat salty snacks throughout the day (both at home and at school) to minimize the likeliness of dizziness. Please allow frequent bathroom breaks due to increased fluid intake. There should be no dark water swimming without a life vest and there should be no clear water swimming without adult or  supervision.      No endocarditis prophylaxis is recommended in this circumstance.      Medications:   Medication List with Changes/Refills   Changed and/or Refilled Medications    Modified Medication Previous Medication    ATENOLOL (TENORMIN) 25 MG TABLET atenoloL (TENORMIN) 25 MG tablet       Take 1 tablet (25 mg total) by mouth once daily.    Take 0.5 tablets (12.5 mg total) by mouth once daily.         Orders  placed this encounter  Orders Placed This Encounter   Procedures    EKG 12-lead       Follow-Up:   Return to dysautonomia clinic in 1 year with EKG or sooner if there are any concerns    Sincerely,  Tony Haley MD    Note Contributing Authors:  MD Karen Skinner PA-C  04/15/2024    Attestation: Tony Haley MD  I have reviewed the records and agree with the above. I agree with the plan and the follow up instructions.

## 2024-04-15 ENCOUNTER — OFFICE VISIT (OUTPATIENT)
Dept: PEDIATRIC CARDIOLOGY | Facility: CLINIC | Age: 19
End: 2024-04-15
Payer: COMMERCIAL

## 2024-04-15 VITALS
WEIGHT: 128.75 LBS | RESPIRATION RATE: 18 BRPM | HEIGHT: 65 IN | OXYGEN SATURATION: 99 % | SYSTOLIC BLOOD PRESSURE: 110 MMHG | HEART RATE: 74 BPM | DIASTOLIC BLOOD PRESSURE: 72 MMHG | BODY MASS INDEX: 21.45 KG/M2

## 2024-04-15 DIAGNOSIS — I35.1 NONRHEUMATIC AORTIC VALVE INSUFFICIENCY: ICD-10-CM

## 2024-04-15 DIAGNOSIS — G90.1 DYSAUTONOMIA: Primary | ICD-10-CM

## 2024-04-15 DIAGNOSIS — I47.10 SVT (SUPRAVENTRICULAR TACHYCARDIA): ICD-10-CM

## 2024-04-15 PROBLEM — R42 DIZZINESS: Status: RESOLVED | Noted: 2021-05-04 | Resolved: 2024-04-15

## 2024-04-15 PROBLEM — R00.2 PALPITATIONS: Status: RESOLVED | Noted: 2021-02-03 | Resolved: 2024-04-15

## 2024-04-15 PROCEDURE — 1159F MED LIST DOCD IN RCRD: CPT | Mod: CPTII,S$GLB,, | Performed by: PHYSICIAN ASSISTANT

## 2024-04-15 PROCEDURE — 99213 OFFICE O/P EST LOW 20 MIN: CPT | Mod: S$GLB,,, | Performed by: PHYSICIAN ASSISTANT

## 2024-04-15 PROCEDURE — 3008F BODY MASS INDEX DOCD: CPT | Mod: CPTII,S$GLB,, | Performed by: PHYSICIAN ASSISTANT

## 2024-04-15 PROCEDURE — 1160F RVW MEDS BY RX/DR IN RCRD: CPT | Mod: CPTII,S$GLB,, | Performed by: PHYSICIAN ASSISTANT

## 2024-04-15 PROCEDURE — 3078F DIAST BP <80 MM HG: CPT | Mod: CPTII,S$GLB,, | Performed by: PHYSICIAN ASSISTANT

## 2024-04-15 PROCEDURE — 3074F SYST BP LT 130 MM HG: CPT | Mod: CPTII,S$GLB,, | Performed by: PHYSICIAN ASSISTANT

## 2024-04-15 RX ORDER — ATENOLOL 25 MG/1
25 TABLET ORAL DAILY
Qty: 90 TABLET | Refills: 3 | Status: SHIPPED | OUTPATIENT
Start: 2024-04-15

## 2024-04-15 NOTE — PATIENT INSTRUCTIONS
Tony Haley MD  Pediatric Cardiology  [address] [place] [id number]  [phone]    General Guidelines    Name: Lesia Monreal                   : 2005    Diagnosis:   1. SVT (supraventricular tachycardia)        PCP: Naomi Head FNP-C  PCP Phone Number: [phone]    If you have an emergency or you think you have an emergency, go to the nearest emergency room!     Breathing too fast, doesnt look right, consistently not eating well, your child needs to be checked. These are general indications that your child is not feeling well. This may be caused by anything, a stomach virus, an ear ache or heart disease, so please call Naomi Head FNP-C. If Naomi Head FNP-C thinks you need to be checked for your heart, they will let us know.     If your child experiences a rapid or very slow heart rate and has the following symptoms, call Naomi Head FNP-C or go to the nearest emergency room.   unexplained chest pain   does not look right   feels like they are going to pass out   actually passes out for unexplained reasons   weakness or fatigue   shortness of breath  or breathing fast   consistent poor feeding     If your child experiences a rapid or very slow heart rate that lasts longer than 30 minutes call Naomi Head FNP-C or go to the nearest emergency room.     If your child feels like they are going to pass out - have them sit down or lay down immediately. Raise the feet above the head (prop the feet on a chair or the wall) until the feeling passes. Slowly allow the child to sit, then stand. If the feeling returns, lay back down and start over.     It is very important that you notify Naomi Head FNP-C first. Naomi Head FNP-C or the ER Physician can reach Dr. Tony Haley at the office or through [facility] at [phone] as needed.    Call our office ([phone]) one week after ALL tests for results.